# Patient Record
Sex: FEMALE | Race: WHITE | NOT HISPANIC OR LATINO | Employment: FULL TIME | URBAN - METROPOLITAN AREA
[De-identification: names, ages, dates, MRNs, and addresses within clinical notes are randomized per-mention and may not be internally consistent; named-entity substitution may affect disease eponyms.]

---

## 2017-01-04 ENCOUNTER — ALLSCRIPTS OFFICE VISIT (OUTPATIENT)
Dept: OTHER | Facility: OTHER | Age: 57
End: 2017-01-04

## 2017-01-09 ENCOUNTER — GENERIC CONVERSION - ENCOUNTER (OUTPATIENT)
Dept: OTHER | Facility: OTHER | Age: 57
End: 2017-01-09

## 2017-01-17 DIAGNOSIS — S93.402D SPRAIN OF UNSPECIFIED LIGAMENT OF LEFT ANKLE, SUBSEQUENT ENCOUNTER: ICD-10-CM

## 2017-02-06 ENCOUNTER — GENERIC CONVERSION - ENCOUNTER (OUTPATIENT)
Dept: OTHER | Facility: OTHER | Age: 57
End: 2017-02-06

## 2017-02-15 ENCOUNTER — ALLSCRIPTS OFFICE VISIT (OUTPATIENT)
Dept: OTHER | Facility: OTHER | Age: 57
End: 2017-02-15

## 2017-03-02 ENCOUNTER — GENERIC CONVERSION - ENCOUNTER (OUTPATIENT)
Dept: OTHER | Facility: OTHER | Age: 57
End: 2017-03-02

## 2017-03-15 ENCOUNTER — HOSPITAL ENCOUNTER (OUTPATIENT)
Dept: RADIOLOGY | Facility: CLINIC | Age: 57
Discharge: HOME | End: 2017-03-15
Payer: OTHER MISCELLANEOUS

## 2017-03-15 ENCOUNTER — ALLSCRIPTS OFFICE VISIT (OUTPATIENT)
Dept: OTHER | Facility: OTHER | Age: 57
End: 2017-03-15

## 2017-03-15 DIAGNOSIS — M79.672 PAIN OF LEFT FOOT: ICD-10-CM

## 2017-03-15 DIAGNOSIS — S93.402D SPRAIN OF UNSPECIFIED LIGAMENT OF LEFT ANKLE, SUBSEQUENT ENCOUNTER: ICD-10-CM

## 2017-03-15 PROCEDURE — 73630 X-RAY EXAM OF FOOT: CPT

## 2017-03-27 ENCOUNTER — GENERIC CONVERSION - ENCOUNTER (OUTPATIENT)
Dept: OTHER | Facility: OTHER | Age: 57
End: 2017-03-27

## 2017-04-13 ENCOUNTER — GENERIC CONVERSION - ENCOUNTER (OUTPATIENT)
Dept: OTHER | Facility: OTHER | Age: 57
End: 2017-04-13

## 2017-04-14 ENCOUNTER — ALLSCRIPTS OFFICE VISIT (OUTPATIENT)
Dept: OTHER | Facility: OTHER | Age: 57
End: 2017-04-14

## 2018-01-13 VITALS
HEIGHT: 62 IN | HEART RATE: 69 BPM | WEIGHT: 190 LBS | SYSTOLIC BLOOD PRESSURE: 107 MMHG | DIASTOLIC BLOOD PRESSURE: 74 MMHG | BODY MASS INDEX: 34.96 KG/M2

## 2018-01-14 VITALS
WEIGHT: 189.25 LBS | HEIGHT: 62 IN | SYSTOLIC BLOOD PRESSURE: 112 MMHG | HEART RATE: 68 BPM | BODY MASS INDEX: 34.82 KG/M2 | DIASTOLIC BLOOD PRESSURE: 73 MMHG

## 2018-01-14 VITALS
WEIGHT: 190 LBS | SYSTOLIC BLOOD PRESSURE: 124 MMHG | HEART RATE: 74 BPM | DIASTOLIC BLOOD PRESSURE: 86 MMHG | BODY MASS INDEX: 34.96 KG/M2 | HEIGHT: 62 IN

## 2018-01-14 VITALS
HEIGHT: 62 IN | SYSTOLIC BLOOD PRESSURE: 118 MMHG | HEART RATE: 72 BPM | WEIGHT: 190 LBS | DIASTOLIC BLOOD PRESSURE: 82 MMHG | BODY MASS INDEX: 34.96 KG/M2

## 2019-03-28 ENCOUNTER — TRANSCRIBE ORDERS (OUTPATIENT)
Dept: ADMINISTRATIVE | Facility: HOSPITAL | Age: 59
End: 2019-03-28

## 2019-03-28 DIAGNOSIS — E21.3 HYPERPARATHYROIDISM, UNSPECIFIED (HCC): Primary | ICD-10-CM

## 2019-03-28 DIAGNOSIS — E04.9 ENLARGEMENT OF THYROID: ICD-10-CM

## 2019-04-01 ENCOUNTER — HOSPITAL ENCOUNTER (OUTPATIENT)
Dept: ULTRASOUND IMAGING | Facility: HOSPITAL | Age: 59
Discharge: HOME | End: 2019-04-01
Payer: COMMERCIAL

## 2019-04-01 DIAGNOSIS — E04.9 ENLARGEMENT OF THYROID: ICD-10-CM

## 2019-04-01 DIAGNOSIS — E21.3 HYPERPARATHYROIDISM, UNSPECIFIED (HCC): ICD-10-CM

## 2019-04-01 PROCEDURE — 76536 US EXAM OF HEAD AND NECK: CPT

## 2019-06-06 ENCOUNTER — OFFICE VISIT (OUTPATIENT)
Dept: ENDOCRINOLOGY | Facility: HOSPITAL | Age: 59
End: 2019-06-06
Payer: COMMERCIAL

## 2019-06-06 VITALS
WEIGHT: 196.8 LBS | HEART RATE: 66 BPM | BODY MASS INDEX: 34.87 KG/M2 | DIASTOLIC BLOOD PRESSURE: 82 MMHG | SYSTOLIC BLOOD PRESSURE: 120 MMHG | HEIGHT: 63 IN

## 2019-06-06 DIAGNOSIS — E04.2 GOITER, NONTOXIC, MULTINODULAR: ICD-10-CM

## 2019-06-06 DIAGNOSIS — E21.3 HYPERPARATHYROIDISM (HCC): Primary | ICD-10-CM

## 2019-06-06 DIAGNOSIS — E55.9 VITAMIN D DEFICIENCY: ICD-10-CM

## 2019-06-06 PROCEDURE — 99244 OFF/OP CNSLTJ NEW/EST MOD 40: CPT | Performed by: INTERNAL MEDICINE

## 2019-06-06 RX ORDER — LORAZEPAM 0.5 MG/1
0.5 TABLET ORAL 3 TIMES DAILY PRN
Refills: 3 | COMMUNITY
Start: 2019-05-02

## 2019-06-07 ENCOUNTER — HOSPITAL ENCOUNTER (OUTPATIENT)
Dept: MAMMOGRAPHY | Facility: CLINIC | Age: 59
Discharge: HOME | End: 2019-06-07
Payer: COMMERCIAL

## 2019-06-07 DIAGNOSIS — E21.3 HYPERPARATHYROIDISM (HCC): ICD-10-CM

## 2019-06-07 DIAGNOSIS — E55.9 VITAMIN D DEFICIENCY: ICD-10-CM

## 2019-06-07 PROCEDURE — 77080 DXA BONE DENSITY AXIAL: CPT

## 2019-07-01 ENCOUNTER — TELEPHONE (OUTPATIENT)
Dept: ENDOCRINOLOGY | Facility: HOSPITAL | Age: 59
End: 2019-07-01

## 2019-07-01 NOTE — TELEPHONE ENCOUNTER
Please review result note from 6/7, she would like to know what the treatment plan is    Surgery, monitor, or medication?

## 2019-07-03 NOTE — TELEPHONE ENCOUNTER
Based on her studies, she has a low vitamin-D but elevated parathyroid hormone level with normal calcium  At this point given her urine calcium is normal in her DEXA scan is unremarkable, surgery is not indicated an observation would be the treatment of choice  I would recommend though starting vitamin-D at least 1000 units daily if she is not already on that  I would then have her follow up in 6 months with repeat CMP, phosphorus, 25 hydroxy vitamin-D level, and parathyroid hormone level

## 2019-07-05 DIAGNOSIS — E21.3 HYPERPARATHYROIDISM (HCC): Primary | ICD-10-CM

## 2019-07-05 DIAGNOSIS — E55.9 VITAMIN D DEFICIENCY: ICD-10-CM

## 2019-07-05 DIAGNOSIS — E04.2 GOITER, NONTOXIC, MULTINODULAR: ICD-10-CM

## 2020-01-15 ENCOUNTER — OFFICE VISIT (OUTPATIENT)
Dept: ENDOCRINOLOGY | Facility: HOSPITAL | Age: 60
End: 2020-01-15
Payer: COMMERCIAL

## 2020-01-15 VITALS
HEART RATE: 74 BPM | HEIGHT: 63 IN | BODY MASS INDEX: 34.3 KG/M2 | WEIGHT: 193.6 LBS | SYSTOLIC BLOOD PRESSURE: 122 MMHG | DIASTOLIC BLOOD PRESSURE: 80 MMHG

## 2020-01-15 DIAGNOSIS — E55.9 VITAMIN D DEFICIENCY: ICD-10-CM

## 2020-01-15 DIAGNOSIS — E21.3 HYPERPARATHYROIDISM (HCC): Primary | ICD-10-CM

## 2020-01-15 DIAGNOSIS — E04.2 GOITER, NONTOXIC, MULTINODULAR: ICD-10-CM

## 2020-01-15 PROCEDURE — 99214 OFFICE O/P EST MOD 30 MIN: CPT | Performed by: INTERNAL MEDICINE

## 2020-01-15 RX ORDER — OMEGA-3S/DHA/EPA/FISH OIL/D3 300MG-1000
400 CAPSULE ORAL DAILY
COMMUNITY
End: 2021-04-22 | Stop reason: SDUPTHER

## 2020-01-15 NOTE — PATIENT INSTRUCTIONS
We'll await the lab work and we'll call you  Continue to drink plenty of water  If all is well, follow up in 1 year with blood work and thyroid ultrasound
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
## 2020-01-20 DIAGNOSIS — E21.3 HYPERPARATHYROIDISM (HCC): Primary | ICD-10-CM

## 2020-01-20 DIAGNOSIS — E55.9 VITAMIN D DEFICIENCY: ICD-10-CM

## 2021-04-12 ENCOUNTER — TRANSCRIBE ORDERS (OUTPATIENT)
Dept: ADMINISTRATIVE | Facility: HOSPITAL | Age: 61
End: 2021-04-12

## 2021-04-12 DIAGNOSIS — E04.2 MULTIPLE THYROID NODULES: Primary | ICD-10-CM

## 2021-04-14 ENCOUNTER — HOSPITAL ENCOUNTER (OUTPATIENT)
Dept: ULTRASOUND IMAGING | Facility: HOSPITAL | Age: 61
Discharge: HOME | End: 2021-04-14
Attending: INTERNAL MEDICINE
Payer: COMMERCIAL

## 2021-04-14 DIAGNOSIS — E04.2 GOITER, NONTOXIC, MULTINODULAR: ICD-10-CM

## 2021-04-14 DIAGNOSIS — E55.9 VITAMIN D DEFICIENCY: ICD-10-CM

## 2021-04-14 DIAGNOSIS — E04.2 MULTIPLE THYROID NODULES: ICD-10-CM

## 2021-04-14 DIAGNOSIS — E21.3 HYPERPARATHYROIDISM (HCC): ICD-10-CM

## 2021-04-14 PROCEDURE — 76536 US EXAM OF HEAD AND NECK: CPT

## 2021-04-22 ENCOUNTER — OFFICE VISIT (OUTPATIENT)
Dept: ENDOCRINOLOGY | Facility: HOSPITAL | Age: 61
End: 2021-04-22
Payer: COMMERCIAL

## 2021-04-22 VITALS
WEIGHT: 196.2 LBS | HEIGHT: 63 IN | DIASTOLIC BLOOD PRESSURE: 80 MMHG | BODY MASS INDEX: 34.76 KG/M2 | SYSTOLIC BLOOD PRESSURE: 120 MMHG | HEART RATE: 90 BPM

## 2021-04-22 DIAGNOSIS — E04.2 GOITER, NONTOXIC, MULTINODULAR: ICD-10-CM

## 2021-04-22 DIAGNOSIS — E55.9 VITAMIN D DEFICIENCY: ICD-10-CM

## 2021-04-22 DIAGNOSIS — E21.3 HYPERPARATHYROIDISM (HCC): Primary | ICD-10-CM

## 2021-04-22 PROCEDURE — 1036F TOBACCO NON-USER: CPT | Performed by: INTERNAL MEDICINE

## 2021-04-22 PROCEDURE — 99214 OFFICE O/P EST MOD 30 MIN: CPT | Performed by: INTERNAL MEDICINE

## 2021-04-22 PROCEDURE — 3008F BODY MASS INDEX DOCD: CPT | Performed by: INTERNAL MEDICINE

## 2021-04-22 RX ORDER — OMEGA-3S/DHA/EPA/FISH OIL/D3 300MG-1000
2000 CAPSULE ORAL DAILY
Start: 2021-04-22 | End: 2021-08-31

## 2021-04-22 NOTE — PATIENT INSTRUCTIONS
The blood work is stable  increase the vitamin D to 2000 units daily  Star once done with vitamin D 51435 loading dose  Follow  Up in 1 year with blood work

## 2021-04-22 NOTE — PROGRESS NOTES
4/22/2021    Assessment/Plan      Diagnoses and all orders for this visit:    Hyperparathy[address] ([facility] [address] )  -     Cancel: Comprehensive metabolic panel Lab Collect; Future  -     Cancel: CBC and differential Lab Collect; Future  -     Cancel: Vitamin D 25 hydroxy Lab Collect; Future  -     Cancel: TSH, 3rd generation Lab Collect; Future  -     Cancel: T4, free Lab Collect; Future  -     Cancel: Phosphorus Lab Collect; Future  -     Cancel: PTH, intact Lab Collect Lab Collect; Future  -     Cancel: Comprehensive metabolic panel Lab Collect  -     Cancel: CBC and differential Lab Collect  -     Cancel: Vitamin D 25 hydroxy Lab Collect  -     Cancel: TSH, 3rd generation Lab Collect  -     Cancel: T4, free Lab Collect  -     Cancel: Phosphorus Lab Collect  -     Cancel: PTH, intact Lab Collect Lab Collect  -     Comprehensive metabolic panel Lab Collect; Future  -     CBC and differential Lab Collect; Future  -     Vitamin D 25 hydroxy Lab Collect; Future  -     TSH, 3rd generation Lab Collect; Future  -     T4, free Lab Collect; Future  -     Phosphorus Lab Collect; Future  -     PTH, intact Lab Collect Lab Collect; Future    Goiter, nontoxic, multinodular  -     Cancel: Comprehensive metabolic panel Lab Collect; Future  -     Cancel: CBC and differential Lab Collect; Future  -     Cancel: Vitamin D 25 hydroxy Lab Collect; Future  -     Cancel: TSH, 3rd generation Lab Collect; Future  -     Cancel: T4, free Lab Collect; Future  -     Cancel: Phosphorus Lab Collect; Future  -     Cancel: PTH, intact Lab Collect Lab Collect; Future  -     Cancel: Comprehensive metabolic panel Lab Collect  -     Cancel: CBC and differential Lab Collect  -     Cancel: Vitamin D 25 hydroxy Lab Collect  -     Cancel: TSH, 3rd generation Lab Collect  -     Cancel: T4, free Lab Collect  -     Cancel: Phosphorus Lab Collect  -     Cancel: PTH, intact Lab Collect Lab Collect  -     Comprehensive metabolic panel Lab Collect;  Future  -     CBC and differential Lab Collect; Future  -     Vitamin D 25 hydroxy Lab Collect; Future  -     TSH, 3rd generation Lab Collect; Future  -     T4, free Lab Collect; Future  -     Phosphorus Lab Collect; Future  -     PTH, intact Lab Collect Lab Collect; Future    Vitamin D deficiency  -     Cancel: Comprehensive metabolic panel Lab Collect; Future  -     Cancel: CBC and differential Lab Collect; Future  -     Cancel: Vitamin D 25 hydroxy Lab Collect; Future  -     Cancel: TSH, 3rd generation Lab Collect; Future  -     Cancel: T4, free Lab Collect; Future  -     Cancel: Phosphorus Lab Collect; Future  -     Cancel: PTH, intact Lab Collect Lab Collect; Future  -     Cancel: Comprehensive metabolic panel Lab Collect  -     Cancel: CBC and differential Lab Collect  -     Cancel: Vitamin D 25 hydroxy Lab Collect  -     Cancel: TSH, 3rd generation Lab Collect  -     Cancel: T4, free Lab Collect  -     Cancel: Phosphorus Lab Collect  -     Cancel: PTH, intact Lab Collect Lab Collect  -     cholecalciferol (VITAMIN D3) 400 units tablet; Take 5 tablets (2,000 Units total) by mouth daily When remembers  -     Comprehensive metabolic panel Lab Collect; Future  -     CBC and differential Lab Collect; Future  -     Vitamin D 25 hydroxy Lab Collect; Future  -     TSH, 3rd generation Lab Collect; Future  -     T4, free Lab Collect; Future  -     Phosphorus Lab Collect; Future  -     PTH, intact Lab Collect Lab Collect; Future    Other orders  -     Multiple Vitamin (MULTI-VITAMIN DAILY PO); Take by mouth        Assessment/Plan:    1  Hyperparathyroidism  Calcium level remains normal with an elevated parathyroid hormone level, she has Normocalcemic hyperparathyroidism  At this point, no treatment needs to be done other than observation over time and liberal fluid intake  Once her calcium starts to elevate, then surgery will need to be considered and studies will need to be done to evaluate which parathyroid is the problem      2  Nontoxic multinodular goiter  Most recent thyroid function tests are normal   She is biochemically and clinically euthyroid  Thyroid ultrasound shows stable size thyroid nodule sizes which have actually decreased in size  She has no compressive thyroid symptoms  3  Vitamin-D deficiency  She is going to be starting vitamin-D 53143 units via loading dose per her PCP as her vitamin-D level is low  Once she finishes her loading dose, I have recommended vitamin-D 2000 units daily  I have asked her to follow up in 1 year with preceding CMP, phosphorus, intact parathyroid hormone level,   Twenty-five hydroxy vitamin-D level, TSH, free T4, and CBC  CC:   Hyperparathyroid, vitamin-D deficiency, thyroid nodule and goiter follow-up    History of Present Illness     HPI: Chris More is a 61y o  year old female with history of Normocalcemic hyperparathyroidism, vitamin-D deficiency, and nontoxic multinodular goiter here for follow-up visit  She was originally found to have hyperparathyroidism in 2010  Parathyroid hormone levels have been high with a normal calcium have been followed over time  She has polyuria and always has a dry mouth so drinks frequently  She has no nocturia  She does notice some difficulties with focusing but no mental confusion  She denies constipation but is fatigued  She was noted to have vitamin-D deficiency and vitamin-D replacement was performed in the past    She denies perioral paresthesias  She has a nontoxic multinodular goiter with at least 1 nodule biopsied in the past which was benign on pathology  Last thyroid ultrasound was April 2019 showing unchanged thyroid nodule sizes with no suspicious features  She tends to be on the cold side in general   She has no heat intolerance, palpitation, or tremors  Weight is 3 lb more than last year    She has some instances where she will fall asleep earlier at night than usual   She has some dry skin but no hair loss or brittle nails  She does not sleep much between 2 and 7:00 a m  and denies diarrhea or constipation, anxiety or depression  She has no compressive thyroid symptoms or difficulties with swallowing  Review of Systems   Constitutional: Positive for fatigue  Negative for unexpected weight change  Weight 3 lb more than January 2020  Has had more instances where falls asleep at earlier in the night than normal    HENT: Negative for trouble swallowing  Eyes: Negative for visual disturbance  Wears glasses  No diplopia  Respiratory: Negative for chest tightness and shortness of breath  Cardiovascular: Negative for chest pain and palpitations  Gastrointestinal: Positive for abdominal distention  Negative for abdominal pain, constipation, diarrhea and nausea  Abdomen distends at night then will urinate and improved at night  Endocrine: Positive for cold intolerance and polyuria  Negative for heat intolerance and polydipsia  Nocturia none  Mouth gets dry, drinks frequently  Always tends to be on the cold side  Musculoskeletal: Negative for back pain  Some swelling of the forearms  Skin: Negative for rash and wound  Some dry skin  No brittle nails  Getting ridges in the nails  No hair loss now, have an episode for several years  Neurological: Positive for headaches  Negative for dizziness, tremors, light-headedness and numbness  Some random headaches  No perioral numbness or tingling  Will get tingling down legs with electrical like impulses down legs when feeling hot, from increase in activity  Psychiatric/Behavioral: Positive for sleep disturbance  Negative for confusion and dysphoric mood  The patient is not nervous/anxious  Sleeps from 2 am to 7 am and wakes up  Some difficulties with focusing recently, more daydreaming         Historical Information   Past Medical History:   Diagnosis Date    Seizures ([facility] [address] )     no meds since 1996, last seizure      Past Surgical History:   Procedure Laterality Date    CHOLECYSTECTOMY      KNEE SURGERY Left     LAPAROSCOPY      TONSILLECTOMY       Social History   Social History     Substance and Sexual Activity   Alcohol Use Yes    Frequency: Monthly or less    Comment: "occasional"     Social History     Substance and Sexual Activity   Drug Use No     Social History     Tobacco Use   Smoking Status Former Smoker    Packs/day: 1 00    Types: Cigarettes    Quit date: 18    Years since quittin 3   Smokeless Tobacco Never Used     Family History:   Family History   Problem Relation Age of Onset    Alzheimer's disease Mother     Kidney disease Mother     Deep vein thrombosis Mother     Skin cancer Father     Alcohol abuse Father     Hypothyroidism Brother     Hashimoto's thyroiditis Daughter     Allergies Sister     Thyroid disease unspecified Sister         nodules    No Known Problems Brother     Mental retardation Brother     Mitral valve prolapse Sister     No Known Problems Daughter     No Known Problems Son     No Known Problems Son        Meds/Allergies   Current Outpatient Medications   Medication Sig Dispense Refill    cholecalciferol (VITAMIN D3) 400 units tablet Take 400 Units by mouth daily When remembers      LORazepam (ATIVAN) 0 5 mg tablet Take 0 5 mg by mouth 3 (three) times a day as needed  3    Multiple Vitamin (MULTI-VITAMIN DAILY PO) Take by mouth       No current facility-administered medications for this visit  Allergies   Allergen Reactions    Penicillins Vomiting       Objective   Vitals: Blood pressure 120/80, pulse 90, height 5' 3" (1 6 m), weight 89 kg (196 lb 3 2 oz)  Invasive Devices     None                 Physical Exam  Vitals signs reviewed  Constitutional:       Appearance: Normal appearance  She is well-developed  She is obese  HENT:      Head: Normocephalic and atraumatic     Eyes:      Extraocular Movements: Extraocular movements intact  Conjunctiva/sclera: Conjunctivae normal       Comments: No lid lag, stare, proptosis, or periorbital edema  Neck:      Musculoskeletal: Normal range of motion and neck supple  Thyroid: No thyromegaly  Vascular: No carotid bruit  Comments: Thyroid normal in size  2 cm left-sided thyroid nodule palpable  No masses of the neck  Cardiovascular:      Rate and Rhythm: Normal rate and regular rhythm  Heart sounds: Normal heart sounds  No murmur  Pulmonary:      Effort: Pulmonary effort is normal       Breath sounds: Normal breath sounds  No wheezing  Abdominal:      Palpations: Abdomen is soft  Musculoskeletal: Normal range of motion  General: No deformity  Right lower leg: No edema  Left lower leg: No edema  Comments: No tremor of the outstretched hands  No spinous process tenderness  No CVA tenderness  Lymphadenopathy:      Cervical: No cervical adenopathy  Skin:     General: Skin is warm and dry  Findings: No rash  Neurological:      Mental Status: She is alert and oriented to person, place, and time  Deep Tendon Reflexes: Reflexes are normal and symmetric  Comments: Deep tendon reflexes normal          The history was obtained from the review of the chart and from the patient  Lab Results:      Blood work done on 04/08/2021 showed a TSH of 0 66  free T3 is 3 55  CBC is normal     CMP showed a glucose of 97 fasting, chloride 111, calcium 9 9 with an albumin of 4 1 but was otherwise normal     Twenty-five hydroxy vitamin-D level is 22  intact parathyroid hormone level is 236 2  Total cholesterol 173, triglyceride 82, HDL 77, LDL 80      Thyroid ultrasound:    THYROID ULTRASOUND Performed on 04/14/2021     INDICATION:    E21 3: Hyperparathyroidism, unspecified  E55 9: Vitamin D deficiency, unspecified  E04 2: Nontoxic multinodular goiter      COMPARISON:  Compared with 4/1/2019     TECHNIQUE:   Ultrasound of the thyroid was performed with a high frequency linear transducer in transverse and sagittal planes including volumetric imaging sweeps as well as traditional still imaging technique      FINDINGS:  Thyroid parenchyma is diffusely heterogeneous in echotexture with focal nodule(s) as described below      Right lobe:  5 3 x 1 6 x 1 9 cm  Volume of 7 3 mL  Left lobe: cm  Isthmus:  0 3 cm      Nodule #1  Image 6  RIGHT midgland nodule measuring 1 7 x 1 2 x 1 0 cm  Compared with 2 3 x 1 6 x 1 5 cm   COMPOSITION:  1 point, mixed cystic and solid  ECHOGENICITY:  1 point, hyperechoic or isoechoic  SHAPE:  0 points, wider-than-tall  MARGIN: 0 points, ill-defined  ECHOGENIC FOCI:  0 points, none or large comet-tail artifacts  TI-RADS Classification: TR 2 (2 points), Not suspicious  No FNA      Nodule #2  Image 40  LEFT midgland nodule measuring 1 8 x 1 2 x 1 3 cm  Compared with 2 0 x 1 2 x 1 4 cm   COMPOSITION:  2 points, solid or almost completely solid   ECHOGENICITY:  1 point, hyperechoic or isoechoic  SHAPE:  0 points, wider-than-tall  MARGIN: 0 points, ill-defined  ECHOGENIC FOCI:  0 points, none or large comet-tail artifacts  TI-RADS Classification: TR 3 (3 points), FNA if >2 5 cm  Follow if >1 5 cm      There are additional nodules of lesser size and/or TI-RADS score  These do not necessitate additional evaluation based on ACR criteria       IMPRESSION:  Bilateral thyroid nodules with decrease in size from prior study as described  No nodule meets current ACR criteria for requiring biopsy but followup ultrasound is recommended in 1 year         Future Appointments   Date Time Provider Blane Marin   5/1/2021 11:40  Shelby [place]

## 2021-04-29 ENCOUNTER — TRANSCRIBE ORDERS (OUTPATIENT)
Dept: ADMINISTRATIVE | Facility: HOSPITAL | Age: 61
End: 2021-04-29

## 2021-04-29 DIAGNOSIS — R10.32 ABDOMINAL PAIN, LEFT LOWER QUADRANT: Primary | ICD-10-CM

## 2021-05-01 ENCOUNTER — IMMUNIZATIONS (OUTPATIENT)
Dept: FAMILY MEDICINE CLINIC | Facility: HOSPITAL | Age: 61
End: 2021-05-01
Payer: COMMERCIAL

## 2021-05-01 ENCOUNTER — HOSPITAL ENCOUNTER (OUTPATIENT)
Dept: ULTRASOUND IMAGING | Facility: HOSPITAL | Age: 61
Discharge: HOME | End: 2021-05-01
Payer: COMMERCIAL

## 2021-05-01 DIAGNOSIS — R10.32 ABDOMINAL PAIN, LEFT LOWER QUADRANT: ICD-10-CM

## 2021-05-01 DIAGNOSIS — Z23 ENCOUNTER FOR IMMUNIZATION: Primary | ICD-10-CM

## 2021-05-01 PROCEDURE — 0001A SARS-COV-2 / COVID-19 MRNA VACCINE (PFIZER-BIONTECH) 30 MCG: CPT

## 2021-05-01 PROCEDURE — 91300 SARS-COV-2 / COVID-19 MRNA VACCINE (PFIZER-BIONTECH) 30 MCG: CPT

## 2021-05-01 PROCEDURE — 76856 US EXAM PELVIC COMPLETE: CPT

## 2021-05-01 PROCEDURE — 76830 TRANSVAGINAL US NON-OB: CPT

## 2021-05-11 ENCOUNTER — OFFICE VISIT (OUTPATIENT)
Dept: OBGYN CLINIC | Facility: CLINIC | Age: 61
End: 2021-05-11
Payer: COMMERCIAL

## 2021-05-11 VITALS
SYSTOLIC BLOOD PRESSURE: 120 MMHG | BODY MASS INDEX: 35.26 KG/M2 | DIASTOLIC BLOOD PRESSURE: 80 MMHG | HEIGHT: 63 IN | WEIGHT: 199 LBS

## 2021-05-11 DIAGNOSIS — N95.0 PMB (POSTMENOPAUSAL BLEEDING): Primary | ICD-10-CM

## 2021-05-11 DIAGNOSIS — N93.0 POSTMENOPAUSAL POSTCOITAL BLEEDING: ICD-10-CM

## 2021-05-11 DIAGNOSIS — R93.89 THICKENED ENDOMETRIUM: ICD-10-CM

## 2021-05-11 DIAGNOSIS — N95.0 POSTMENOPAUSAL POSTCOITAL BLEEDING: ICD-10-CM

## 2021-05-11 PROCEDURE — 88305 TISSUE EXAM BY PATHOLOGIST: CPT | Performed by: PATHOLOGY

## 2021-05-11 PROCEDURE — 3008F BODY MASS INDEX DOCD: CPT | Performed by: OBSTETRICS & GYNECOLOGY

## 2021-05-11 PROCEDURE — 58100 BIOPSY OF UTERUS LINING: CPT | Performed by: OBSTETRICS & GYNECOLOGY

## 2021-05-11 PROCEDURE — 1036F TOBACCO NON-USER: CPT | Performed by: OBSTETRICS & GYNECOLOGY

## 2021-05-11 PROCEDURE — 99241 PR OFFICE CONSULTATION NEW/ESTAB PATIENT 15 MIN: CPT | Performed by: OBSTETRICS & GYNECOLOGY

## 2021-05-11 RX ORDER — MELOXICAM 15 MG/1
15 TABLET ORAL DAILY PRN
COMMUNITY
Start: 2021-04-21

## 2021-05-11 RX ORDER — TERBINAFINE HYDROCHLORIDE 250 MG/1
250 TABLET ORAL DAILY
COMMUNITY
Start: 2021-02-08

## 2021-05-11 RX ORDER — ERGOCALCIFEROL 1.25 MG/1
50000 CAPSULE ORAL WEEKLY
COMMUNITY
Start: 2021-04-19

## 2021-05-11 NOTE — PROGRESS NOTES
Endometrial biopsy    Date/Time: 5/11/2021 11:05 AM  Performed by: Govind Vila DO  Authorized by: Govind Vila DO   Universal Protocol:  Consent: Verbal consent obtained  Risks and benefits: risks, benefits and alternatives were discussed  Consent given by: patient  Patient understanding: patient states understanding of the procedure being performed  Procedure consent: procedure consent matches procedure scheduled  Relevant documents: relevant documents present and verified  Patient identity confirmed: verbally with patient      Indication:     Indications: Post-coital bleeding and Post-menopausal bleeding      Chronicity of post-menopausal bleeding:  New    Progression of post-menopausal bleeding:  Resolved  Procedure:     Procedure: endometrial biopsy with Pipelle      A bivalve speculum was placed in the vagina: yes      Cervix cleaned and prepped: yes      A paracervical block was performed: no      An intracervical block was performed: no      The cervix was dilated: yes      Uterus sounded: yes      Uterus sound depth (cm):  7    Specimen collected: specimen collected and sent to pathology      Patient tolerated procedure well with no complications: yes    Findings:     Uterus size:  Non-gravid    Cervix: stenotic      Adnexa: normal    Comments:     Procedure comments:  Pt presented as a "New patient" after Pelvic U/S, ordered by PCP indicated thickened endometrium  U/S was done for evaluation of pelvic Pain  Pt also admits to having had intermittent post-coital bleeding and PMB  Has not had any evaluation or any GYN care for several years  Previous GYN was in [facility]

## 2021-05-29 ENCOUNTER — IMMUNIZATIONS (OUTPATIENT)
Dept: FAMILY MEDICINE CLINIC | Facility: HOSPITAL | Age: 61
End: 2021-05-29

## 2021-05-29 DIAGNOSIS — Z23 ENCOUNTER FOR IMMUNIZATION: Primary | ICD-10-CM

## 2021-05-29 PROCEDURE — 91300 SARS-COV-2 / COVID-19 MRNA VACCINE (PFIZER-BIONTECH) 30 MCG: CPT

## 2021-05-29 PROCEDURE — 0002A SARS-COV-2 / COVID-19 MRNA VACCINE (PFIZER-BIONTECH) 30 MCG: CPT

## 2021-06-29 ENCOUNTER — TELEPHONE (OUTPATIENT)
Dept: OBGYN CLINIC | Facility: CLINIC | Age: 61
End: 2021-06-29

## 2021-06-29 ENCOUNTER — OFFICE VISIT (OUTPATIENT)
Dept: OBGYN CLINIC | Facility: CLINIC | Age: 61
End: 2021-06-29
Payer: COMMERCIAL

## 2021-06-29 VITALS
BODY MASS INDEX: 34.66 KG/M2 | HEIGHT: 63 IN | WEIGHT: 195.6 LBS | SYSTOLIC BLOOD PRESSURE: 120 MMHG | DIASTOLIC BLOOD PRESSURE: 68 MMHG

## 2021-06-29 DIAGNOSIS — R93.89 THICKENED ENDOMETRIUM: Primary | ICD-10-CM

## 2021-06-29 DIAGNOSIS — Z87.42 HISTORY OF POSTMENOPAUSAL BLEEDING: ICD-10-CM

## 2021-06-29 PROCEDURE — 3008F BODY MASS INDEX DOCD: CPT | Performed by: OBSTETRICS & GYNECOLOGY

## 2021-06-29 PROCEDURE — 99214 OFFICE O/P EST MOD 30 MIN: CPT | Performed by: OBSTETRICS & GYNECOLOGY

## 2021-06-29 PROCEDURE — 1036F TOBACCO NON-USER: CPT | Performed by: OBSTETRICS & GYNECOLOGY

## 2021-06-29 NOTE — TELEPHONE ENCOUNTER
Per Dr Pearl Gonzales, patient to schedule SHG  She is capitated to [facility]   Yeyo Gerard confirming scheduling protocal

## 2021-06-30 NOTE — TELEPHONE ENCOUNTER
Ori Haji called to check status of scheduling [facility] Advised  currently confirming with [facility]'s scheduling Fransico Reza would like to know if she can just schedule for the procedure  She feels Dr Vanessa Vázquez informed her she could schedule one or the other  Dr Vanessa Vázquez, please clarify if [facility] indicated prior to surgical procedure?

## 2021-06-30 NOTE — TELEPHONE ENCOUNTER
Phone conversation with Dr Seamus Nina  She would like patient to complete SHG to confirm polyp or fibroid  Spoke with Maureen Moncada and advised to proceed with SHG   Should have answer by tomorrow on protocal for scheduling

## 2021-07-01 NOTE — PROGRESS NOTES
Assessment/Plan:      Diagnoses and all orders for this visit:    Thickened endometrium  -     Cancel: Sonohysterogram  -     Sonohysterogram; Future        -     EMBx and pelvic U/S results reviewed with patient  Informed pt of path c/w benign but atrophic endometrium and negative for malignancy  Possibility of Endometrial based on thickened EMS per U/S and EMBx finding reviewed  Option of proceeding with Hysteroscopy, polypectomy or SHG reviewed  Both options, R&B, indications and procedure reviewed with patient  All questions answered  40 minutes spent in direct face to face conversation with patient, not including the time spent completing pt's note after the visit or time spent reviewing chart prior to start of visit  -    Pt desires to proceed with SHG  Will inform pt if results WNL, otherwise will advise pt to schedule [facility]  to discuss management of abnormal findings  History of postmenopausal bleeding    Other orders  -     ciclopirox (PENLAC) 8 % solution; APPLY TO AFFECTED AREA EVERY DAY  -     BIOTIN PO; Take 1 tablet by mouth daily  -     Pyridoxine HCl (VITAMIN B6 PO); Take 1 tablet by mouth daily  -     Cyanocobalamin (VITAMIN B12 PO); Take 1 tablet by mouth daily          Subjective:     Patient ID: Neris Sorensen is a 64 y o  female  Pt presents to review EMBx results  Denies having had any vaginal bleeding since previous GYN visit      Review of Systems   Constitutional: Negative for activity change and unexpected weight change  Gastrointestinal: Negative for abdominal pain  Genitourinary: Negative for dyspareunia, pelvic pain and vaginal bleeding           Objective:     Physical Exam

## 2021-07-01 NOTE — TELEPHONE ENCOUNTER
Marylen Mini she can call [facility] to schedule SHG  Requested she contact PCP to obtain referral  [facility] unable to generate referral at this time due to problem with insurance updateing [facility] provider numbers  Patient verbalized understanding

## 2021-07-01 NOTE — TELEPHONE ENCOUNTER
Received call from Elham Sauer ([address] Radiology [facility]) stating that the order for the [facility] is placed incorrectly (under procedure) so they are unable to see the order through [address] [place] [address]  She provided the correct order which is Ultrasound Sonohysterogram w or w/o Doppler (SKC901)  Correct order placed in Epic for [facility] and signed  Elham Sauer states she will contact patient to ensure she obtain a referral from her PCP for the imaging  Also received call from patient looking for the procedure codes for the [facility] so she can verify her insurance to check for coverage and the PCP need the codes as well for referral  Provided pt with procedure codes 07354 and 33285  Pt verbalized understanding

## 2021-07-27 ENCOUNTER — TELEPHONE (OUTPATIENT)
Dept: OBGYN CLINIC | Facility: CLINIC | Age: 61
End: 2021-07-27

## 2021-07-27 NOTE — TELEPHONE ENCOUNTER
Spoke with Danielle Montague is continuing to look into this  She spoke with [place] at 4:50 and will follow up prior to 11 am tomorrow

## 2021-07-27 NOTE — TELEPHONE ENCOUNTER
Scott Yee @[facility] states patient is scheduled at [facility] for  Study 7/28 that is where she is capitated to   Please send order and confirm with Scott Yee [phone]

## 2021-07-27 NOTE — TELEPHONE ENCOUNTER
David Mcdonald from [facility] requesting office report to complete authorization for SonoHyst study to be faxed to [phone]  Request sent

## 2021-07-27 NOTE — TELEPHONE ENCOUNTER
Chris Guanako called stating she received call from [facility] indicating she cannot have her SHG done there as planned for tomorrow  She needs to obtain an out of cap referral  She is currently capitated to [place]  Contact # for [phone])  Reviewed above with Meredith Lopez who is looking into this

## 2021-07-27 NOTE — TELEPHONE ENCOUNTER
I called [facility] and spoke to the auth/precert dept which told me that they could not help me with our request  I was told that if the pt's cap site could not do the test it would need to be subcontracted, she was not sure how this was done and transferred me  I was transferred to customer service who also could not help and was transferred over to the provider's line  I spoke to Pierrette Denver and explained the entire situation and asked if she could have study done here with a referral  He placed me on hold and spoke to a precert lead to see what could be done for the member  I was told the member could change her PCP, could obtain a out of cap referral which he wasn't sure how to obtain but suggested a form on the provider portal per his lead or the member could have the test done it would be out of network and would not be covered   The Ref # for the call is [id number]

## 2021-07-28 ENCOUNTER — HOSPITAL ENCOUNTER (OUTPATIENT)
Dept: RADIOLOGY | Facility: HOSPITAL | Age: 61
Discharge: HOME | End: 2021-07-28
Attending: OBSTETRICS & GYNECOLOGY | Admitting: RADIOLOGY
Payer: COMMERCIAL

## 2021-07-28 DIAGNOSIS — R93.89 THICKENED ENDOMETRIUM: ICD-10-CM

## 2021-07-28 PROCEDURE — 58340 CATHETER FOR HYSTEROGRAPHY: CPT

## 2021-07-28 PROCEDURE — 76831 ECHO EXAM UTERUS: CPT

## 2021-07-28 NOTE — TELEPHONE ENCOUNTER
I called Kari Ellsworth and [facility]) at 8:50am [phone]  who received the medical records, she will forward them to the medical director for review  Kari called back with approval case [id number]  I then called [place] at [address] to inform them of the updated information for the study so the test could be done today

## 2021-07-28 NOTE — TELEPHONE ENCOUNTER
I called Precious Marioppard and [facility]) at 8:50am [phone]  who received the medical records, she will forward them to the medical director for review  Precious Duarte called back with approval case [id number]  I then called [place] at [address] to inform them of the updated information for the study

## 2021-08-24 ENCOUNTER — CONSULT (OUTPATIENT)
Dept: OBGYN CLINIC | Facility: CLINIC | Age: 61
End: 2021-08-24
Payer: COMMERCIAL

## 2021-08-24 VITALS
HEIGHT: 63 IN | BODY MASS INDEX: 34.59 KG/M2 | SYSTOLIC BLOOD PRESSURE: 120 MMHG | WEIGHT: 195.2 LBS | DIASTOLIC BLOOD PRESSURE: 60 MMHG

## 2021-08-24 DIAGNOSIS — N84.0 ENDOMETRIAL POLYP: Primary | ICD-10-CM

## 2021-08-24 DIAGNOSIS — N95.0 POSTMENOPAUSAL BLEEDING: ICD-10-CM

## 2021-08-24 PROCEDURE — 99213 OFFICE O/P EST LOW 20 MIN: CPT | Performed by: OBSTETRICS & GYNECOLOGY

## 2021-08-24 PROCEDURE — 1036F TOBACCO NON-USER: CPT | Performed by: OBSTETRICS & GYNECOLOGY

## 2021-08-24 PROCEDURE — 3008F BODY MASS INDEX DOCD: CPT | Performed by: OBSTETRICS & GYNECOLOGY

## 2021-08-24 NOTE — PROGRESS NOTES
Assessment/Plan:      Diagnoses and all orders for this visit:    Endometrial polyp    -  Informed pt of SHG being c/w endometrial polyps  -  D&C, hysteroscopy and poylpectomy recommended  Indication, R&B and procedure reviewed  -  All questions answered     -  H&P done and informed consent obtained    Postmenopausal bleeding    -  EMBx benign    -  SHG findings significant for endometrial polyp       Subjective:     Patient ID: Waldo Griggs is a 64 y o  female  Pt presents to review SHG results and to discuss management options      Review of Systems   Constitutional: Negative for activity change and fatigue  Genitourinary: Negative for pelvic pain, vaginal bleeding, vaginal discharge and vaginal pain  Objective:     Physical Exam  Vitals and nursing note reviewed  HENT:      Head: Normocephalic  Cardiovascular:      Rate and Rhythm: Normal rate  Pulses: Normal pulses  Heart sounds: Normal heart sounds  Pulmonary:      Effort: Pulmonary effort is normal       Breath sounds: Normal breath sounds  Abdominal:      General: There is no distension  Palpations: Abdomen is soft  There is no mass  Tenderness: There is no abdominal tenderness  There is no rebound  Genitourinary:     General: Normal vulva  Exam position: Lithotomy position  Labia:         Right: No rash, tenderness or lesion  Left: No rash, tenderness or lesion  Urethra: No urethral pain or urethral lesion  Vagina: Normal  No vaginal discharge  Cervix: No cervical motion tenderness, lesion or erythema  Uterus: Normal        Adnexa: Right adnexa normal and left adnexa normal       Rectum: No external hemorrhoid  Musculoskeletal:      Right lower leg: No edema  Left lower leg: No edema  Skin:     General: Skin is warm  Neurological:      Mental Status: She is alert and oriented to person, place, and time     Psychiatric:         Mood and Affect: Mood normal          Behavior: Behavior normal          Thought Content:  Thought content normal

## 2021-08-24 NOTE — H&P (VIEW-ONLY)
Assessment/Plan:      Diagnoses and all orders for this visit:    Endometrial polyp    -  Informed pt of SHG being c/w endometrial polyps  -  D&C, hysteroscopy and poylpectomy recommended  Indication, R&B and procedure reviewed  -  All questions answered     -  H&P done and informed consent obtained    Postmenopausal bleeding    -  EMBx benign    -  SHG findings significant for endometrial polyp       Subjective:     Patient ID: Lluvia Castillo is a 64 y o  female  Pt presents to review SHG results and to discuss management options      Review of Systems   Constitutional: Negative for activity change and fatigue  Genitourinary: Negative for pelvic pain, vaginal bleeding, vaginal discharge and vaginal pain  Objective:     Physical Exam  Vitals and nursing note reviewed  HENT:      Head: Normocephalic  Cardiovascular:      Rate and Rhythm: Normal rate  Pulses: Normal pulses  Heart sounds: Normal heart sounds  Pulmonary:      Effort: Pulmonary effort is normal       Breath sounds: Normal breath sounds  Abdominal:      General: There is no distension  Palpations: Abdomen is soft  There is no mass  Tenderness: There is no abdominal tenderness  There is no rebound  Genitourinary:     General: Normal vulva  Exam position: Lithotomy position  Labia:         Right: No rash, tenderness or lesion  Left: No rash, tenderness or lesion  Urethra: No urethral pain or urethral lesion  Vagina: Normal  No vaginal discharge  Cervix: No cervical motion tenderness, lesion or erythema  Uterus: Normal        Adnexa: Right adnexa normal and left adnexa normal       Rectum: No external hemorrhoid  Musculoskeletal:      Right lower leg: No edema  Left lower leg: No edema  Skin:     General: Skin is warm  Neurological:      Mental Status: She is alert and oriented to person, place, and time     Psychiatric:         Mood and Affect: Mood normal          Behavior: Behavior normal          Thought Content:  Thought content normal

## 2021-08-31 NOTE — PRE-PROCEDURE INSTRUCTIONS
Pre-Surgery Instructions:   Medication Instructions    BIOTIN PO Patient was instructed by Physician and understands   Cyanocobalamin (VITAMIN B12 PO) Patient was instructed by Physician and understands   ergocalciferol (VITAMIN D2) 50,000 units Patient was instructed by Physician and understands   LORazepam (ATIVAN) 0 5 mg tablet Instructed patient per Anesthesia Guidelines   meloxicam (MOBIC) 15 mg tablet Patient was instructed by Physician and understands   Multiple Vitamin (MULTI-VITAMIN DAILY PO) Patient was instructed by Physician and understands   Pyridoxine HCl (VITAMIN B6 PO) Patient was instructed by Physician and understands  Pre-op medication, and showering instructions with antibacteral soap reviewed  Pt  Verbalized understanding of current visitor restrictions  Pt  Verbalized an understanding of all instructions reviewed and offers no concerns at this time  Instructed to avoid all ASA/NSAIDs and OTC Vit/Supp from now until after surgery per anesthesia guidelines   Tylenol ok prn  DOS instructed to take Ativan as needed with a few sips of H2O

## 2021-09-02 ENCOUNTER — LAB (OUTPATIENT)
Dept: LAB | Facility: HOSPITAL | Age: 61
End: 2021-09-02
Attending: OBSTETRICS & GYNECOLOGY
Payer: COMMERCIAL

## 2021-09-02 ENCOUNTER — LAB REQUISITION (OUTPATIENT)
Dept: LAB | Facility: HOSPITAL | Age: 61
End: 2021-09-02
Payer: COMMERCIAL

## 2021-09-02 ENCOUNTER — ANESTHESIA EVENT (OUTPATIENT)
Dept: PERIOP | Facility: HOSPITAL | Age: 61
End: 2021-09-02
Payer: COMMERCIAL

## 2021-09-02 DIAGNOSIS — N84.0 POLYP OF CORPUS UTERI: ICD-10-CM

## 2021-09-02 DIAGNOSIS — N95.0 POSTMENOPAUSAL BLEEDING: ICD-10-CM

## 2021-09-02 DIAGNOSIS — Z01.818 ENCOUNTER FOR OTHER PREPROCEDURAL EXAMINATION: ICD-10-CM

## 2021-09-02 DIAGNOSIS — Z01.818 ENCOUNTER FOR PREADMISSION TESTING: ICD-10-CM

## 2021-09-02 DIAGNOSIS — N84.0 ENDOMETRIAL POLYP: ICD-10-CM

## 2021-09-02 LAB
ABO GROUP BLD: NORMAL
ANION GAP SERPL CALCULATED.3IONS-SCNC: 9 MMOL/L (ref 4–13)
BLD GP AB SCN SERPL QL: NEGATIVE
BUN SERPL-MCNC: 23 MG/DL (ref 5–25)
CALCIUM SERPL-MCNC: 9.7 MG/DL (ref 8.3–10.1)
CHLORIDE SERPL-SCNC: 110 MMOL/L (ref 100–108)
CO2 SERPL-SCNC: 26 MMOL/L (ref 21–32)
CREAT SERPL-MCNC: 0.93 MG/DL (ref 0.6–1.3)
ERYTHROCYTE [DISTWIDTH] IN BLOOD BY AUTOMATED COUNT: 12.8 % (ref 11.6–15.1)
GFR SERPL CREATININE-BSD FRML MDRD: 67 ML/MIN/1.73SQ M
GLUCOSE SERPL-MCNC: 104 MG/DL (ref 65–140)
HCT VFR BLD AUTO: 42.6 % (ref 34.8–46.1)
HGB BLD-MCNC: 14 G/DL (ref 11.5–15.4)
MCH RBC QN AUTO: 31.1 PG (ref 26.8–34.3)
MCHC RBC AUTO-ENTMCNC: 32.9 G/DL (ref 31.4–37.4)
MCV RBC AUTO: 95 FL (ref 82–98)
PLATELET # BLD AUTO: 163 THOUSANDS/UL (ref 149–390)
PMV BLD AUTO: 10.4 FL (ref 8.9–12.7)
POTASSIUM SERPL-SCNC: 3.9 MMOL/L (ref 3.5–5.3)
RBC # BLD AUTO: 4.5 MILLION/UL (ref 3.81–5.12)
RH BLD: NEGATIVE
SODIUM SERPL-SCNC: 145 MMOL/L (ref 136–145)
SPECIMEN EXPIRATION DATE: NORMAL
WBC # BLD AUTO: 4.21 THOUSAND/UL (ref 4.31–10.16)

## 2021-09-02 PROCEDURE — 80048 BASIC METABOLIC PNL TOTAL CA: CPT

## 2021-09-02 PROCEDURE — 86901 BLOOD TYPING SEROLOGIC RH(D): CPT | Performed by: OBSTETRICS & GYNECOLOGY

## 2021-09-02 PROCEDURE — 86850 RBC ANTIBODY SCREEN: CPT | Performed by: OBSTETRICS & GYNECOLOGY

## 2021-09-02 PROCEDURE — 85027 COMPLETE CBC AUTOMATED: CPT

## 2021-09-02 PROCEDURE — 36415 COLL VENOUS BLD VENIPUNCTURE: CPT

## 2021-09-02 PROCEDURE — 86900 BLOOD TYPING SEROLOGIC ABO: CPT | Performed by: OBSTETRICS & GYNECOLOGY

## 2021-09-02 NOTE — ANESTHESIA PREPROCEDURE EVALUATION
Procedure:  DILATATION AND CURETTAGE (D&C) WITH HYSTEROSCOPY AND POLYPECTOMY (N/A Uterus)    Relevant Problems   ENDO   (+) Hyperparathyroidism (HCC)      Other   (+) Goiter, nontoxic, multinodular      Diaphragmatic hernia  Smoker  Anxiety  Remote hx seizures  BMI 35  Endometrial polyps  As per endocrine: Normocalcemic hyperparathyroidism  At this point, no treatment needs to be done other than observation over time and liberal fluid intake      Physical Exam    Airway    Mallampati score: III  TM Distance: >3 FB  Neck ROM: full     Dental   Comment: Removable dentures upper,     Cardiovascular      Pulmonary      Other Findings        Anesthesia Plan  ASA Score- 3     Anesthesia Type- general with ASA Monitors  Additional Monitors:   Airway Plan: ETT  Comment: GA with LMA/ETT, IV, antiemetics  Plan Factors-    Chart reviewed  Existing labs reviewed  Patient summary reviewed  Induction- intravenous  Postoperative Plan-   Planned trial extubation    Informed Consent- Anesthetic plan and risks discussed with patient  I personally reviewed this patient with the CRNA  Discussed and agreed on the Anesthesia Plan with the CRNA  Deondre Arias

## 2021-09-03 ENCOUNTER — HOSPITAL ENCOUNTER (OUTPATIENT)
Facility: HOSPITAL | Age: 61
Setting detail: OUTPATIENT SURGERY
Discharge: HOME | End: 2021-09-08
Attending: OBSTETRICS & GYNECOLOGY | Admitting: OBSTETRICS & GYNECOLOGY
Payer: COMMERCIAL

## 2021-09-03 ENCOUNTER — ANESTHESIA (OUTPATIENT)
Dept: PERIOP | Facility: HOSPITAL | Age: 61
End: 2021-09-03
Payer: COMMERCIAL

## 2021-09-03 VITALS
HEIGHT: 63 IN | SYSTOLIC BLOOD PRESSURE: 101 MMHG | BODY MASS INDEX: 34.98 KG/M2 | HEART RATE: 75 BPM | RESPIRATION RATE: 16 BRPM | OXYGEN SATURATION: 95 % | DIASTOLIC BLOOD PRESSURE: 54 MMHG | WEIGHT: 197.4 LBS | TEMPERATURE: 97.4 F

## 2021-09-03 DIAGNOSIS — N84.0 ENDOMETRIAL POLYP: ICD-10-CM

## 2021-09-03 DIAGNOSIS — N95.0 POSTMENOPAUSAL BLEEDING: ICD-10-CM

## 2021-09-03 PROCEDURE — 58558 HYSTEROSCOPY BIOPSY: CPT | Performed by: OBSTETRICS & GYNECOLOGY

## 2021-09-03 PROCEDURE — 88305 TISSUE EXAM BY PATHOLOGIST: CPT | Performed by: PATHOLOGY

## 2021-09-03 PROCEDURE — NC001 PR NO CHARGE: Performed by: ANESTHESIOLOGY

## 2021-09-03 RX ORDER — ONDANSETRON 2 MG/ML
4 INJECTION INTRAMUSCULAR; INTRAVENOUS ONCE
Status: COMPLETED | OUTPATIENT
Start: 2021-09-03 | End: 2021-09-03

## 2021-09-03 RX ORDER — ONDANSETRON 2 MG/ML
INJECTION INTRAMUSCULAR; INTRAVENOUS AS NEEDED
Status: DISCONTINUED | OUTPATIENT
Start: 2021-09-03 | End: 2021-09-03

## 2021-09-03 RX ORDER — DEXAMETHASONE SODIUM PHOSPHATE 4 MG/ML
4 INJECTION, SOLUTION INTRA-ARTICULAR; INTRALESIONAL; INTRAMUSCULAR; INTRAVENOUS; SOFT TISSUE ONCE
Status: COMPLETED | OUTPATIENT
Start: 2021-09-03 | End: 2021-09-03

## 2021-09-03 RX ORDER — FENTANYL CITRATE/PF 50 MCG/ML
25 SYRINGE (ML) INJECTION
Status: DISCONTINUED | OUTPATIENT
Start: 2021-09-03 | End: 2021-09-03 | Stop reason: HOSPADM

## 2021-09-03 RX ORDER — SODIUM CHLORIDE, SODIUM LACTATE, POTASSIUM CHLORIDE, CALCIUM CHLORIDE 600; 310; 30; 20 MG/100ML; MG/100ML; MG/100ML; MG/100ML
125 INJECTION, SOLUTION INTRAVENOUS CONTINUOUS
Status: DISCONTINUED | OUTPATIENT
Start: 2021-09-03 | End: 2021-09-08 | Stop reason: HOSPADM

## 2021-09-03 RX ORDER — LIDOCAINE HYDROCHLORIDE 10 MG/ML
INJECTION, SOLUTION EPIDURAL; INFILTRATION; INTRACAUDAL; PERINEURAL AS NEEDED
Status: DISCONTINUED | OUTPATIENT
Start: 2021-09-03 | End: 2021-09-03

## 2021-09-03 RX ORDER — FENTANYL CITRATE 50 UG/ML
INJECTION, SOLUTION INTRAMUSCULAR; INTRAVENOUS AS NEEDED
Status: DISCONTINUED | OUTPATIENT
Start: 2021-09-03 | End: 2021-09-03

## 2021-09-03 RX ORDER — LIDOCAINE HYDROCHLORIDE 10 MG/ML
INJECTION, SOLUTION EPIDURAL; INFILTRATION; INTRACAUDAL; PERINEURAL AS NEEDED
Status: DISCONTINUED | OUTPATIENT
Start: 2021-09-03 | End: 2021-09-03 | Stop reason: HOSPADM

## 2021-09-03 RX ORDER — MIDAZOLAM HYDROCHLORIDE 2 MG/2ML
INJECTION, SOLUTION INTRAMUSCULAR; INTRAVENOUS AS NEEDED
Status: DISCONTINUED | OUTPATIENT
Start: 2021-09-03 | End: 2021-09-03

## 2021-09-03 RX ORDER — KETOROLAC TROMETHAMINE 30 MG/ML
INJECTION, SOLUTION INTRAMUSCULAR; INTRAVENOUS AS NEEDED
Status: DISCONTINUED | OUTPATIENT
Start: 2021-09-03 | End: 2021-09-03

## 2021-09-03 RX ORDER — DEXAMETHASONE SODIUM PHOSPHATE 10 MG/ML
INJECTION, SOLUTION INTRAMUSCULAR; INTRAVENOUS AS NEEDED
Status: DISCONTINUED | OUTPATIENT
Start: 2021-09-03 | End: 2021-09-03

## 2021-09-03 RX ORDER — SUCCINYLCHOLINE/SOD CL,ISO/PF 100 MG/5ML
SYRINGE (ML) INTRAVENOUS AS NEEDED
Status: DISCONTINUED | OUTPATIENT
Start: 2021-09-03 | End: 2021-09-03

## 2021-09-03 RX ORDER — PROPOFOL 10 MG/ML
INJECTION, EMULSION INTRAVENOUS AS NEEDED
Status: DISCONTINUED | OUTPATIENT
Start: 2021-09-03 | End: 2021-09-03

## 2021-09-03 RX ORDER — MAGNESIUM HYDROXIDE 1200 MG/15ML
LIQUID ORAL AS NEEDED
Status: DISCONTINUED | OUTPATIENT
Start: 2021-09-03 | End: 2021-09-03 | Stop reason: HOSPADM

## 2021-09-03 RX ORDER — ROCURONIUM BROMIDE 10 MG/ML
INJECTION, SOLUTION INTRAVENOUS AS NEEDED
Status: DISCONTINUED | OUTPATIENT
Start: 2021-09-03 | End: 2021-09-03

## 2021-09-03 RX ADMIN — PROPOFOL 200 MG: 10 INJECTION, EMULSION INTRAVENOUS at 07:38

## 2021-09-03 RX ADMIN — ONDANSETRON 4 MG: 2 INJECTION INTRAMUSCULAR; INTRAVENOUS at 07:40

## 2021-09-03 RX ADMIN — Medication 100 MG: at 07:38

## 2021-09-03 RX ADMIN — FENTANYL CITRATE 50 MCG: 50 INJECTION, SOLUTION INTRAMUSCULAR; INTRAVENOUS at 07:59

## 2021-09-03 RX ADMIN — DEXAMETHASONE SODIUM PHOSPHATE 4 MG: 10 INJECTION, SOLUTION INTRAMUSCULAR; INTRAVENOUS at 07:40

## 2021-09-03 RX ADMIN — DEXAMETHASONE SODIUM PHOSPHATE 4 MG: 4 INJECTION, SOLUTION INTRA-ARTICULAR; INTRALESIONAL; INTRAMUSCULAR; INTRAVENOUS; SOFT TISSUE at 09:47

## 2021-09-03 RX ADMIN — ROCURONIUM BROMIDE 10 MG: 10 INJECTION, SOLUTION INTRAVENOUS at 07:38

## 2021-09-03 RX ADMIN — KETOROLAC TROMETHAMINE 30 MG: 30 INJECTION, SOLUTION INTRAMUSCULAR; INTRAVENOUS at 08:07

## 2021-09-03 RX ADMIN — FENTANYL CITRATE 50 MCG: 50 INJECTION, SOLUTION INTRAMUSCULAR; INTRAVENOUS at 07:41

## 2021-09-03 RX ADMIN — SODIUM CHLORIDE, SODIUM LACTATE, POTASSIUM CHLORIDE, AND CALCIUM CHLORIDE 125 ML/HR: .6; .31; .03; .02 INJECTION, SOLUTION INTRAVENOUS at 09:08

## 2021-09-03 RX ADMIN — MIDAZOLAM 2 MG: 1 INJECTION INTRAMUSCULAR; INTRAVENOUS at 07:32

## 2021-09-03 RX ADMIN — ONDANSETRON 4 MG: 2 INJECTION INTRAMUSCULAR; INTRAVENOUS at 09:21

## 2021-09-03 RX ADMIN — LIDOCAINE HYDROCHLORIDE 50 MG: 10 INJECTION, SOLUTION EPIDURAL; INFILTRATION; INTRACAUDAL; PERINEURAL at 07:38

## 2021-09-03 RX ADMIN — SODIUM CHLORIDE, SODIUM LACTATE, POTASSIUM CHLORIDE, AND CALCIUM CHLORIDE 125 ML/HR: .6; .31; .03; .02 INJECTION, SOLUTION INTRAVENOUS at 07:00

## 2021-09-03 NOTE — OP NOTE
OPERATIVE REPORT  PATIENT NAME: Ursula Roy    :  1960  MRN: [id number]  Pt Location: [facility] OR ROOM 02    SURGERY DATE: 9/3/2021    Surgeon(s) and Role:     Xu Thakkar DO - Primary    Preop Diagnosis:  Postmenopausal bleeding [N95 0]  Endometrial polyp [N84 0]    Post-Op Diagnosis Codes:     * Postmenopausal bleeding [N95 0]     * Endometrial polyp [N84 0]    Procedure(s) (LRB):  DILATATION AND CURETTAGE (D&C) WITH HYSTEROSCOPY AND POLYPECTOMY (N/A)    Specimen(s):  ID Type Source Tests Collected by Time Destination   1 : Endometrial polyps Tissue Polyp, Cervical/Endometrial TISSUE EXAM Jerelyn Fail, DO 9/3/2021 8857    2 : Endometrial curettings Tissue Endometrium TISSUE EXAM Jerelyn Fail, DO 9/3/2021 0812        Estimated Blood Loss:   Minimal    Drains:  * No LDAs found *    Anesthesia Type:   GET Anesthesia    Operative Indications:  Postmenopausal bleeding [N95 0]  Endometrial polyp [N84 0]      Operative Findings:  Normal appearing external genitalia and cervix  Uterus sounded to 7 cm  Multiple endometrial polyps noted  B/L ostia visualized after polyps resected      Complications:   None    Procedure and Technique:  OPERATIVE REPORT  PATIENT NAME: Ursula Roy    :  1960  MRN: [id number]  Pt Location: [facility] OR ROOM 02    SURGERY DATE: 9/3/2021    Surgeon(s) and Role:     Xu Thakkar DO - Primary    Postmenopausal bleeding [N95 0]  Endometrial polyp [N84 0]    Post-Op Diagnosis Codes:     * Postmenopausal bleeding [N95 0]     * Endometrial polyp [N84 0]    Procedure(s):  DILATATION AND CURETTAGE (D&C) WITH HYSTEROSCOPY AND POLYPECTOMY    Specimen(s):  ID Type Source Tests Collected by Time Destination   1 : Endometrial polyps Tissue Polyp, Cervical/Endometrial TISSUE EXAM Jerelyn Fail, DO 9/3/2021 2564    2 : Endometrial curettings Tissue Endometrium TISSUE EXAM Jerelyn Fail, DO 9/3/2021 9449          Brief History    All risks, benefits, and alternatives to the procedure were discussed with the patient and she had the opportunity to ask questions  Informed consent was obtained  Description of Procedure    Patient was taken to the operating room were a time out was performed to confirm correct patient and correct procedure  GET was administered and the patient was positioned on the OR table in the dorsal lithotomy position  All pressure points were padded and a jeane hugger was placed to maintain control of core body temperature  A bimanual exam was performed and the uterus was noted to be anteverted, normal in size and consistency with no palpable adnexal masses or fullness  The patient was prepped and draped in the usual sterile fashion  Operative Technique    A Graves speculum was inserted into the vagina and cervix was visualized  A single tooth tnaculum was used to grasp the anterior lip of the cervix  The uterus was sounded to 7 cm  The cervix was serially dilated to 19 using Chavez dilators for introduction of the hysteroscope  Hysteroscope was introduced under direct visualization using normal saline solution as the distention media  Hysteroscope was advanced to the uterine fundus and the entire uterine cavity was inspected in a systematic manner  There was noted to be multiple polyps  Halima 3 6 mm resection device was inserted via Hysteroscope and all polyps were resected under direct visualization via hysteroscope  Halima and Hysteroscope were withdrawn and sharp curetting was performed, starting at the 12'oclock position and rotating a total of 360 degrees to cover all surfaces  Endometrial tissue was obtained and sent for pathology  The hysteroscope was then re-introduced under direct visualization, again using normal saline solution as the distention media  Fluid deficit was within normal limits  All sponges, instrument and needle counts were correct x 3      Complications:   None      Patient Disposition:  PACU     SIGNATURE: April ChaseDO  DATE: September 3, 2021  TIME: 8:24 AM     I was present for the entire procedure    Patient Disposition:  PACU     SIGNATURE: Kael Garcia DO  DATE: September 3, 2021  TIME: 8:16 AM

## 2021-09-03 NOTE — INTERVAL H&P NOTE
H&P reviewed  After examining the patient I find no changes in the patients condition since the H&P had been written      Vitals:    09/03/21 0651   BP: 115/67   Pulse: 64   Resp: 16   Temp: 98 2 °F (36 8 °C)   SpO2: 97% no

## 2021-09-03 NOTE — ANESTHESIA POSTPROCEDURE EVALUATION
Post-Op Assessment Note    CV Status:  Stable  Pain Score: 0    Pain management: adequate     Mental Status:  Alert   PONV Controlled:  None   Airway Patency:  Patent      Post Op Vitals Reviewed: Yes      Staff: CRNA         No complications documented      BP (P) 106/89 (09/03/21 0824)    Temp (!) (P) 97 4 °F (36 3 °C) (09/03/21 0824)    Pulse (!) (P) 50 (09/03/21 0824)   Resp (P) 20 (09/03/21 0824)    SpO2 (P) 99 % (09/03/21 0824)

## 2021-09-03 NOTE — PROGRESS NOTES
Pt seen in PACU at 0843  BPs are reading 80-90s/50-60s, HR 45-50s  PACU nurse reported that BP soft at the start of PACU encounter (BPs 90s and trending downward)  Pt does feel lightheaded  Reclined her back in bed to lie flat  Ephedrine  5 mg IV  2 doses provided as well as 0 2 mg IV glycopyrrolate to help with HR and BP  IVF opened to provide further hydration  BP improves to 261X systolic  At around 910 am, BPs trending down to the low 90s despite ephedrine/glyco  100 mcg IV phenylephrine provided  Pt continues to lie flat and IVF hydration provided  At 0924, IV zofran provided for nausea symptoms  First bag of IVF finished  At 0947, IV decadron provided for further nausea symptoms and for prolonged hypotension (more than 20% her baseline)    Between 9560-8215, pt slowly sitting up  In phase 2 discharge at 1015 am  Offered PO intake of fluids/snacks  Current BPs in 110-120s, HR in the 60-70s  Pt is awake and feeling well overall  He is sitting up in bed, legs dangling off edge of bed  She denies dizzyness/lightheadedness, nausea  Pt did share an experience with her ACL repair 10 years ago, she required prolonged PACU stay because of low BP and HR, of similar nature to this encounter  Pt also mentioned that she has never had fainting spells before or been diagnosed with vasovagal syncope before  She did mention that she tried donating blood during a blood drive, but upon entering the clinic, she almost passed out and her BP was in the 50s  Her unusual hemodynamics during her PACU phase may be related to slow clearance of anesthetic agents (pt was provided standard anesthesia with minimal narcotic usage) or intrinsic endocrine concern, such as adrenal insufficiency  And additional dose of IV decadron provided to help with hypotension along with PONV prophylaxis  Pt also has family hx of chronic kidney dz   Consider workup for a cause of hypotension/bradycardic episodes (I e cardiac vs  Renal vs  Endocrine vs  Neuro etiology)    Communicated information to patient and her   Dr Sarah Marc and I advised patient to consider followup with her PCP  Any further surgical/anesthesia interventions in the future may require clearances to rule out causes of postoperative prolonged hypotension/bradycardia

## 2021-09-06 ENCOUNTER — DOCUMENTATION (OUTPATIENT)
Dept: OBGYN CLINIC | Facility: CLINIC | Age: 61
End: 2021-09-06

## 2021-09-06 NOTE — PROGRESS NOTES
Pt presented to [address] [place] [address] as out patient on 9/3/2021, for scheduled D&C, hysteroscopy and polypectomy for management of PMB and endometrial polyp        Pt tolerated the procedure well and was discharged to home in stable condition on 9/3/2021 s/p D&C, hysteroscopy and polypectomy on 9/3/2021

## 2021-09-08 PROCEDURE — NC001 PR NO CHARGE: Performed by: OBSTETRICS & GYNECOLOGY

## 2021-09-10 NOTE — DISCHARGE SUMMARY
Pt presented for D&C, hysteroscopy and polypectomy  Procedure was done without any complications  Pt discharged on same day of surgery, from PACU, in stable condition

## 2021-11-24 ENCOUNTER — HOSPITAL ENCOUNTER (OUTPATIENT)
Dept: RADIOLOGY | Facility: HOSPITAL | Age: 61
Discharge: HOME | End: 2021-11-24
Payer: COMMERCIAL

## 2021-11-24 DIAGNOSIS — M25.562 PAIN IN BOTH KNEES, UNSPECIFIED CHRONICITY: ICD-10-CM

## 2021-11-24 DIAGNOSIS — M25.561 PAIN IN BOTH KNEES, UNSPECIFIED CHRONICITY: ICD-10-CM

## 2021-11-24 PROCEDURE — 73562 X-RAY EXAM OF KNEE 3: CPT

## 2022-08-04 ENCOUNTER — HOSPITAL ENCOUNTER (OUTPATIENT)
Dept: RADIOLOGY | Facility: HOSPITAL | Age: 62
Discharge: HOME | End: 2022-08-04
Payer: COMMERCIAL

## 2022-08-04 DIAGNOSIS — M21.621 TAILOR'S BUNIONETTE, RIGHT: ICD-10-CM

## 2022-08-04 PROCEDURE — 73630 X-RAY EXAM OF FOOT: CPT

## 2022-08-11 ENCOUNTER — HOSPITAL ENCOUNTER (OUTPATIENT)
Dept: BONE DENSITY | Facility: CLINIC | Age: 62
Discharge: HOME | End: 2022-08-11
Payer: COMMERCIAL

## 2022-08-11 ENCOUNTER — HOSPITAL ENCOUNTER (OUTPATIENT)
Dept: MAMMOGRAPHY | Facility: CLINIC | Age: 62
Discharge: HOME | End: 2022-08-11
Payer: COMMERCIAL

## 2022-08-11 VITALS — BODY MASS INDEX: 34.91 KG/M2 | WEIGHT: 197 LBS | HEIGHT: 63 IN

## 2022-08-11 DIAGNOSIS — Z12.39 BREAST SCREENING: ICD-10-CM

## 2022-08-11 DIAGNOSIS — E21.3 HYPERPARATHYROIDISM (HCC): ICD-10-CM

## 2022-08-11 DIAGNOSIS — Z12.31 ENCOUNTER FOR SCREENING MAMMOGRAM FOR MALIGNANT NEOPLASM OF BREAST: ICD-10-CM

## 2022-08-11 PROCEDURE — 77080 DXA BONE DENSITY AXIAL: CPT

## 2022-08-11 PROCEDURE — 77063 BREAST TOMOSYNTHESIS BI: CPT

## 2022-08-11 PROCEDURE — 77067 SCR MAMMO BI INCL CAD: CPT

## 2024-07-23 ENCOUNTER — TELEPHONE (OUTPATIENT)
Age: 64
End: 2024-07-23

## 2024-07-23 ENCOUNTER — HOSPITAL ENCOUNTER (OUTPATIENT)
Dept: RADIOLOGY | Facility: HOSPITAL | Age: 64
Discharge: HOME | End: 2024-07-23
Payer: COMMERCIAL

## 2024-07-23 DIAGNOSIS — M81.0 AGE-RELATED OSTEOPOROSIS WITHOUT CURRENT PATHOLOGICAL FRACTURE: ICD-10-CM

## 2024-07-23 DIAGNOSIS — Z12.31 ENCOUNTER FOR SCREENING MAMMOGRAM FOR MALIGNANT NEOPLASM OF BREAST: ICD-10-CM

## 2024-07-23 PROCEDURE — 73502 X-RAY EXAM HIP UNI 2-3 VIEWS: CPT

## 2024-07-23 PROCEDURE — 72040 X-RAY EXAM NECK SPINE 2-3 VW: CPT

## 2024-07-23 NOTE — TELEPHONE ENCOUNTER
Scheduled date of EGD/colonoscopy (as of today): 08/30/2024  Physician performing EGD/colonoscopy: Dr. Baig  Location of EGD/colonoscopy: [facility]  Desired bowel prep reviewed with patient: LOUANN/DUL  Prep instructions sent via [place]  Instructions reviewed with patient by: ALEXANDRIA  Clearances: N/A

## 2024-07-23 NOTE — TELEPHONE ENCOUNTER
07/23/24  Screened by: Hetal Sotomayor    Referring Provider     Pre- Screening: Yes    There is no height or weight on file to calculate BMI.  Has patient been referred for a routine screening Colonoscopy? yes  Is the patient between 45-75 years old? yes      Previous Colonoscopy yes   If yes:    Date: 2010    Facility:     Reason:     Does the patient want to see a Gastroenterologist prior to their procedure OR are they having any GI symptoms? no    Has the patient been hospitalized or had abdominal surgery in the past 6 months? no    Does the patient use supplemental oxygen? no    Does the patient take Coumadin, Lovenox, Plavix, Elliquis, Xarelto, or other blood thinning medication? no    Has the patient had a stroke, cardiac event, or stent placed in the past year? no

## 2024-08-27 ENCOUNTER — HOSPITAL ENCOUNTER (OUTPATIENT)
Dept: BONE DENSITY | Facility: CLINIC | Age: 64
Discharge: HOME | End: 2024-08-27
Payer: COMMERCIAL

## 2024-08-27 ENCOUNTER — HOSPITAL ENCOUNTER (OUTPATIENT)
Dept: MAMMOGRAPHY | Facility: CLINIC | Age: 64
Discharge: HOME | End: 2024-08-27
Payer: COMMERCIAL

## 2024-08-27 VITALS — WEIGHT: 197 LBS | BODY MASS INDEX: 34.91 KG/M2 | HEIGHT: 63 IN

## 2024-08-27 VITALS — WEIGHT: 157 LBS | BODY MASS INDEX: 28.89 KG/M2 | HEIGHT: 62 IN

## 2024-08-27 DIAGNOSIS — M81.0 AGE-RELATED OSTEOPOROSIS WITHOUT CURRENT PATHOLOGICAL FRACTURE: ICD-10-CM

## 2024-08-27 DIAGNOSIS — Z12.31 ENCOUNTER FOR SCREENING MAMMOGRAM FOR MALIGNANT NEOPLASM OF BREAST: ICD-10-CM

## 2024-08-27 PROCEDURE — 77067 SCR MAMMO BI INCL CAD: CPT

## 2024-08-27 PROCEDURE — 77063 BREAST TOMOSYNTHESIS BI: CPT

## 2024-08-27 PROCEDURE — 77080 DXA BONE DENSITY AXIAL: CPT

## 2024-09-09 ENCOUNTER — TELEPHONE (OUTPATIENT)
Dept: GASTROENTEROLOGY | Facility: CLINIC | Age: 64
End: 2024-09-09

## 2024-09-09 NOTE — TELEPHONE ENCOUNTER
Procedure confirmed  Colonoscopy     Via: [facility]    Instructions given: [facility]     Prep Given: Miralax/Dulcolax    Call the office if there are any questions.

## 2024-09-16 ENCOUNTER — TELEPHONE (OUTPATIENT)
Age: 64
End: 2024-09-16

## 2024-09-16 NOTE — TELEPHONE ENCOUNTER
Patients GI provider:  Dr. Christine    Number to return call: [phone]    Reason for call: Pt calling to confirm her insurance is taken at , I checked the list and verified its listed as accepted there. She would also like to know if [facility] is tier 1 or 2 and I was unsure. Please advise?

## 2024-09-29 ENCOUNTER — NURSE TRIAGE (OUTPATIENT)
Dept: OTHER | Facility: OTHER | Age: 64
End: 2024-09-29

## 2024-09-29 NOTE — TELEPHONE ENCOUNTER
"Regarding: Colonoscopy appt time  ----- Message from Manuela SÁNCHEZ sent at 9/29/2024  6:22 PM EDT -----  \"I have a colonoscopy scheduled for tomorrow, but I don't know the time.\"    "

## 2024-09-29 NOTE — TELEPHONE ENCOUNTER
"Reason for Disposition  • Colonoscopy, questions about    Answer Assessment - Initial Assessment Questions  1. DATE/TIME: \"When did you have your colonoscopy?\"       9/30    2. MAIN CONCERN: \"What is your main concern right now?\" \"What questions do you have?\"      What is my arrival time?    Protocols used: Colonoscopy Symptoms and Questions-Adult-AH      Patient advised of 10 am arrival time.  "

## 2024-09-29 NOTE — TELEPHONE ENCOUNTER
"Reason for Disposition   Question about upcoming scheduled surgery, procedure or test, no triage required, and triager able to answer question    Answer Assessment - Initial Assessment Questions  1. REASON FOR CALL: \"What is the main reason for your call?\" or \"How can I best help you?\"   Patient reported eating solid foods before starting bowel prep for colonoscopy scheduled for tomorrow.     Paged on the on call Provider to make sure patient can still keep appointment. On call recommended patient keep more miralax on hand in case the bowel movements are not clear, patient should take extra.     Patient informed and verbalized understanding.    Protocols used: Information Only Call - No Triage-Adult-    "

## 2024-09-30 ENCOUNTER — HOSPITAL ENCOUNTER (OUTPATIENT)
Dept: RADIOLOGY | Facility: HOSPITAL | Age: 64
Discharge: HOME | End: 2024-09-30
Payer: COMMERCIAL

## 2024-09-30 ENCOUNTER — HOSPITAL ENCOUNTER (OUTPATIENT)
Dept: GASTROENTEROLOGY | Facility: HOSPITAL | Age: 64
Setting detail: OUTPATIENT SURGERY
Discharge: HOME | End: 2024-09-30
Attending: INTERNAL MEDICINE
Payer: COMMERCIAL

## 2024-09-30 ENCOUNTER — ANESTHESIA (OUTPATIENT)
Dept: GASTROENTEROLOGY | Facility: HOSPITAL | Age: 64
End: 2024-09-30
Payer: COMMERCIAL

## 2024-09-30 ENCOUNTER — ANESTHESIA EVENT (OUTPATIENT)
Dept: GASTROENTEROLOGY | Facility: HOSPITAL | Age: 64
End: 2024-09-30
Payer: COMMERCIAL

## 2024-09-30 VITALS
HEART RATE: 50 BPM | OXYGEN SATURATION: 100 % | DIASTOLIC BLOOD PRESSURE: 60 MMHG | SYSTOLIC BLOOD PRESSURE: 143 MMHG | RESPIRATION RATE: 18 BRPM | TEMPERATURE: 98.1 F

## 2024-09-30 DIAGNOSIS — R10.84 GENERALIZED ABDOMINAL PAIN: Primary | ICD-10-CM

## 2024-09-30 DIAGNOSIS — Z12.11 SCREENING FOR COLON CANCER: ICD-10-CM

## 2024-09-30 PROCEDURE — 88305 TISSUE EXAM BY PATHOLOGIST: CPT | Performed by: STUDENT IN AN ORGANIZED HEALTH CARE EDUCATION/TRAINING PROGRAM

## 2024-09-30 PROCEDURE — 45385 COLONOSCOPY W/LESION REMOVAL: CPT | Performed by: INTERNAL MEDICINE

## 2024-09-30 PROCEDURE — 74018 RADEX ABDOMEN 1 VIEW: CPT

## 2024-09-30 RX ORDER — SODIUM CHLORIDE 9 MG/ML
INJECTION, SOLUTION INTRAVENOUS CONTINUOUS PRN
Status: DISCONTINUED | OUTPATIENT
Start: 2024-09-30 | End: 2024-09-30

## 2024-09-30 RX ORDER — PROPOFOL 10 MG/ML
INJECTION, EMULSION INTRAVENOUS AS NEEDED
Status: DISCONTINUED | OUTPATIENT
Start: 2024-09-30 | End: 2024-09-30

## 2024-09-30 RX ORDER — LIDOCAINE HYDROCHLORIDE 20 MG/ML
INJECTION, SOLUTION EPIDURAL; INFILTRATION; INTRACAUDAL; PERINEURAL AS NEEDED
Status: DISCONTINUED | OUTPATIENT
Start: 2024-09-30 | End: 2024-09-30

## 2024-09-30 RX ORDER — ONDANSETRON 4 MG/1
4 TABLET, ORALLY DISINTEGRATING ORAL ONCE
Status: COMPLETED | OUTPATIENT
Start: 2024-09-30 | End: 2024-09-30

## 2024-09-30 RX ADMIN — LIDOCAINE HYDROCHLORIDE 100 MG: 20 INJECTION, SOLUTION EPIDURAL; INFILTRATION; INTRACAUDAL; PERINEURAL at 11:17

## 2024-09-30 RX ADMIN — PROPOFOL 100 MG: 10 INJECTION, EMULSION INTRAVENOUS at 11:17

## 2024-09-30 RX ADMIN — SODIUM CHLORIDE: 0.9 INJECTION, SOLUTION INTRAVENOUS at 11:06

## 2024-09-30 RX ADMIN — PROPOFOL 50 MG: 10 INJECTION, EMULSION INTRAVENOUS at 11:29

## 2024-09-30 RX ADMIN — PROPOFOL 100 MG: 10 INJECTION, EMULSION INTRAVENOUS at 11:22

## 2024-09-30 RX ADMIN — ONDANSETRON 4 MG: 4 TABLET, ORALLY DISINTEGRATING ORAL at 12:24

## 2024-09-30 NOTE — ANESTHESIA POSTPROCEDURE EVALUATION
Post-Op Assessment Note    CV Status:  Stable  Pain Score: 0    Pain management: adequate       Mental Status:  Alert and awake   Hydration Status:  Euvolemic   PONV Controlled:  Controlled   Airway Patency:  Patent     Post Op Vitals Reviewed: Yes    No anethesia notable event occurred.    Staff: CRNA               BP    107/59   Temp      Pulse  63   Resp   22   SpO2   9

## 2024-09-30 NOTE — H&P
"History and Physical - [facility]    Wanda Hull 64 y.o. female MRN: [id number]      HPI: Wanda Hull is a 64 y.o. female who presents for colon cancer screening. FHx of colon polyps in brother.    Allergies   Allergen Reactions    Demerol [Meperidine] Vomiting    Penicillins Vomiting         REVIEW OF SYSTEMS: Per the HPI, and otherwise unremarkable.    Historical Information     Past Medical History:   Diagnosis Date    Hyperparathyroidism (HCC)     Seizures (HCC)     no meds since , last seizure      Past Surgical History:   Procedure Laterality Date    CHOLECYSTECTOMY      COLONOSCOPY      KNEE SURGERY Left     LAPAROSCOPY      MI HYSTEROSCOPY BX ENDOMETRIUM&/POLYPC W/WO D&C N/A 2021    Procedure: DILATATION AND CURETTAGE (D&C) WITH HYSTEROSCOPY AND POLYPECTOMY;  Surgeon: Nicole Cote DO;  Location:  MAIN OR;  Service: Gynecology    TONSILLECTOMY       Social History   Social History     Substance and Sexual Activity   Alcohol Use Yes    Comment: \"occasional\"     Social History     Substance and Sexual Activity   Drug Use No     Social History     Tobacco Use   Smoking Status Former    Current packs/day: 0.00    Types: Cigarettes    Quit date:     Years since quittin.7   Smokeless Tobacco Never     Family History   Problem Relation Age of Onset    Alzheimer's disease Mother     Kidney disease Mother     Deep vein thrombosis Mother     Skin cancer Father     Alcohol abuse Father     Allergies Sister     Thyroid disease unspecified Sister         nodules    Mitral valve prolapse Sister     Hypothyroidism Brother     Colon polyps Brother     Skin cancer Brother 72    Mental retardation Brother     Bone cancer Maternal Grandmother     Uterine cancer Maternal Aunt     Hashimoto's thyroiditis Daughter     No Known Problems Daughter     No Known Problems Son        Meds/Allergies       Current Outpatient Medications:     LORazepam (ATIVAN) 0.5 " mg tablet    BIOTIN PO    ciclopirox (PENLAC) 8 % solution    Cyanocobalamin (VITAMIN B12 PO)    ergocalciferol (VITAMIN D2) 50,000 units    meloxicam (MOBIC) 15 mg tablet    Multiple Vitamin (MULTI-VITAMIN DAILY PO)    Pyridoxine HCl (VITAMIN B6 PO)    terbinafine (LamISIL) 250 mg tablet  No current facility-administered medications for this encounter.    Facility-Administered Medications Ordered in Other Encounters:     sodium chloride 0.9 % infusion, , Intravenous, Continuous PRN, New Bag at 09/30/24 1106        Objective     /55   Pulse (!) 53   Temp 98.1 °F (36.7 °C) (Temporal)   Resp 18   SpO2 99%       PHYSICAL EXAM    Gen: NAD AAOx3  Head: Normocephalic, Atraumatic  CV: S1S2 RRR no m/r/g  CHEST: Clear b/l no c/r/w  ABD: soft, +BS NT/ND no masses  EXT: no edema      ASSESSMENT/PLAN:  This is a 64 y.o. year old female here for colonoscopy, and she is stable and optimized for her procedure.

## 2024-09-30 NOTE — ANESTHESIA PREPROCEDURE EVALUATION
Procedure:  COLONOSCOPY    Relevant Problems   ANESTHESIA (within normal limits)      ENDO   (+) Hyperparathyroidism (HCC)        Physical Exam    Airway    Mallampati score: I  TM Distance: >3 FB  Neck ROM: full     Dental   No notable dental hx     Cardiovascular  Cardiovascular exam normal    Pulmonary  Pulmonary exam normal     Other Findings  post-pubertal.      Anesthesia Plan  ASA Score- 2     Anesthesia Type- IV sedation with anesthesia with ASA Monitors.         Additional Monitors:     Airway Plan:     Comment: I discussed risks (reviewed with patient on the anesthesia consent form), benefits and alternatives of monitored sedation including the possibility under sedation to have recall or mild discomfort.  .       Plan Factors-    Chart reviewed.    Patient summary reviewed.                  Induction- intravenous.    Postoperative Plan-         Informed Consent- Anesthetic plan and risks discussed with patient.  I personally reviewed this patient with the CRNA. Discussed and agreed on the Anesthesia Plan with the CRNA..

## 2024-09-30 NOTE — NURSING NOTE
Dr. Dennison spoke with pt regarding xray results. Questions answered. Pt. Encouraged to ambulate. Escorted to car by  for discharge home.

## 2024-09-30 NOTE — PROGRESS NOTES
"Pt. Nauseated and vomited small amt of bile. Burping frequently. C/o \"sharp pains in stomach\". Encouraged hydrating and ambulating today to relieve gas pain. Anti-emetic administered per MAR.   "

## 2024-10-03 PROCEDURE — 88305 TISSUE EXAM BY PATHOLOGIST: CPT | Performed by: STUDENT IN AN ORGANIZED HEALTH CARE EDUCATION/TRAINING PROGRAM

## (undated) DEVICE — STRL ALLENTOWN HYSTEROSCOPY PK: Brand: CARDINAL HEALTH

## (undated) DEVICE — SYRINGE 10ML LL CONTROL TOP

## (undated) DEVICE — MAXI PAD5.51 X 13.78 IN. (14.0 X 35.0 CM)HEAVYCONTOUREDUNSCENTED: Brand: CURITY

## (undated) DEVICE — GLOVE SRG LF STRL BGL SKNSNS 6.5 PF

## (undated) DEVICE — NEEDLE SPINAL 22G X 3.5IN  QUINCKE

## (undated) DEVICE — TISSUE REMOVAL SYSTEM RESECTING DEVICE: Brand: SYMPHION

## (undated) DEVICE — GLOVE INDICATOR PI UNDERGLOVE SZ 6.5 BLUE

## (undated) DEVICE — TISSUE REMOVAL SYSTEM FLUID MANAGEMENT ACCESSORIES: Brand: SYMPHION

## (undated) DEVICE — GLOVE SRG BIOGEL ECLIPSE 6.5

## (undated) DEVICE — TUBING SUCTION 5MM X 12 FT